# Patient Record
(demographics unavailable — no encounter records)

---

## 2024-10-24 NOTE — HISTORY OF PRESENT ILLNESS
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

## 2024-10-24 NOTE — PHYSICAL EXAM
[Alert] : alert [Oriented x 3] : ~L oriented x 3 [Declined] : declined [FreeTextEntry3] : Focused exam: -pink gritty papule nasal tip brown stuck on papules x 2 upper forehead

## 2024-10-24 NOTE — ASSESSMENT
[FreeTextEntry1] : #Actinic keratoses- nasal tip -new problem, uncertain prognosis -Cryotherapy was performed per procedure note. -asked pt to return for follow up if the spots do not resolve with cryotherapy The risks/benefits/alternatives of cryo-destruction was explained to the patient which, include but are not limited to redness, swelling, pain, blistering, scar, discoloration of skin, and recurrence. The patient expressed understanding of these risks and agreed to the procedure. 1 lesions treated with 2 cycles of LN2. The procedure was well tolerated, without complication. Wound care was reviewed.   #SK - upper forehead chronic, stable -reassurance regarding benign nature cryo today given bothersome nature The risks/benefits/alternatives of cryo-destruction was explained to the patient which, include but are not limited to redness, swelling, pain, blistering, scar, discoloration of skin, and recurrence. The patient expressed understanding of these risks and agreed to the procedure. 2 lesions treated with 2 cycles of LN2. The procedure was well tolerated, without complication. Wound care was reviewed.   RTC PRN

## 2024-11-04 NOTE — PLAN
[FreeTextEntry1] : HCM -F/u bloodwork, call patient with results -Up to date on mammogram, pap smear, f/u gyn as scheduled -Patient states she is up to date with colonoscopy, f/u GI as scheduled -Declines hepatitis/HIV screening -Declines shingles vaccine today but states she will consider in future

## 2024-11-04 NOTE — HEALTH RISK ASSESSMENT
[Yes] : Yes [2 - 4 times a month (2 pts)] : 2-4 times a month (2 points) [1 or 2 (0 pts)] : 1 or 2 (0 points) [Never (0 pts)] : Never (0 points) [No] : In the past 12 months have you used drugs other than those required for medical reasons? No [No falls in past year] : Patient reported no falls in the past year [0] : 2) Feeling down, depressed, or hopeless: Not at all (0) [PHQ-2 Negative - No further assessment needed] : PHQ-2 Negative - No further assessment needed [None] : None [With Family] : lives with family [Employed] : employed [Fully functional (bathing, dressing, toileting, transferring, walking, feeding)] : Fully functional (bathing, dressing, toileting, transferring, walking, feeding) [Fully functional (using the telephone, shopping, preparing meals, housekeeping, doing laundry, using] : Fully functional and needs no help or supervision to perform IADLs (using the telephone, shopping, preparing meals, housekeeping, doing laundry, using transportation, managing medications and managing finances) [Never] : Never [Audit-CScore] : 2 [de-identified] : cardio, walking [de-identified] : balanced [[id number]] : 0 [Patient reported mammogram was normal] : Patient reported mammogram was normal [Patient reported PAP Smear was normal] : Patient reported PAP Smear was normal [Patient reported colonoscopy was normal] : Patient reported colonoscopy was normal [HIV test declined] : HIV test declined [Hepatitis C test declined] : Hepatitis C test declined [Change in mental status noted] : No change in mental status noted [] :  [MammogramDate] : 09/24 [PapSmearDate] : 10/23 [ColonoscopyDate] : 01/22 [FreeTextEntry2] : [employer]

## 2024-11-04 NOTE — HISTORY OF PRESENT ILLNESS
[FreeTextEntry1] : Establish care, CPE [de-identified] : 58 yo female presents to establish care, CPE. Last CPE was about 1 year ago with [facility]. Patient has hx HLD, was recommended for patient to start statin but then she had CTA score about 1 year ago which was 0, did not start medication. Patient has been regularly exercising, reports healthy diet. Patient has hx migraines, currently uses Botox for treatment with Dr. Ramos, symptoms well controlled. Patient had FBSE with derm Dr. Oshea last month, had cryotherapy for AK on her nose.  Patient follows with gyn regularly - has appt with Dr. Darden later this month. Last pap smear was last year, WNL. Patient states she had mammo Sep 2024, WNL. Patient had colonoscopy 2-3 years ago, reports WNL, repeat 10 years.  Overall feeling well today.

## 2024-11-11 NOTE — HISTORY OF PRESENT ILLNESS
[FreeTextEntry1] : Ms. Barnett ( at [facility]) presents for initial evaluation and management of dyslipidemia, chronic venous insufficiency, and migraine headache. She was referred by her PMD to manage dyslipidemia.  In general, she feels well.  She denies chest pain and has COATES to several flights of stairs.  Her lab work from 11/06/24 revealed an LDL of 189.  Of note, she had a CT coronary artery calcium scan on 10/26/23 which revealed a CAC score of zero.  We discussed the limitations of a CT CAC scan and discussed pursuing a CT coronary artery angiogram in the future.

## 2024-11-11 NOTE — HISTORY OF PRESENT ILLNESS
[FreeTextEntry1] : Ms. Barnett ( at [facility]) presents for initial evaluation and management of dyslipidemia, chronic venous insufficiency, and migraine headache. She was referred by her PMD to manage dyslipidemia.  In general, she feels well.  She denies chest pain and has COATES to several flights of stairs.  Her lab work from 11/06/24 revealed an LDL of 189.  Of note, she had a CT coronary artery calcium scan on 10/26/23 which revealed a CAC score of zero.  We discussed the limitations of a CT CAC scan and discussed pursuing a CT coronary artery angiogram in the future.

## 2024-11-11 NOTE — ASSESSMENT
[FreeTextEntry1] : ======================================================================================= 1. Dyslipidemia:  (11/04/24):  - will initiate rosuvastatin 20mg po qd (possible adverse effects of new medications discussed)  - discussed therapeutic lifestyle changes to promote improved lipid metabolism  - check repeat lipid profile next visit   2. Chronic venous insufficiency:   - follow up with vascular surgeon, Angelic Gonzalez MD  3. Elevated BP reading: ? component of "white coat" HTN:  - will order a 24-hour ABPM (BioBeat) to r/o white coat HTN

## 2024-11-13 NOTE — REVIEW OF SYSTEMS
[Joint Pain] : joint pain [Muscle Pain] : muscle pain [Headache] : headache [Fever] : no fever [Chills] : no chills [Difficulty Walking] : no difficulty walking

## 2024-11-13 NOTE — PROCEDURE
[de-identified] : DANNY SUMNER , 57 year old,  female returns for follow up and treatment  very happy with RX  Headaches not controllable by medication alone + response to neurotoxin therapy with significant decrease in duration and frequency of headaches and overall better well being, improved functioning and quality of life.   No interim medical issues that would contraindicate treatment today, since last exam Medically stable -reports no change    Denies pregnancy /lactation or N/A Aware of risks and benefits  consent on file wishes to proceed with injections as per protocol  possible side effects including distal spread of toxin reiterated briefly    NEUROTOXIN PROCEDURE NOTE- MIGRAINE BOTOX TOXIN NEUROLYSIS recommended as a means of intervening in the above problems   verbal pre consent obtained -  OnabotulinumtoxinA (200 units ) reconstituted with preservative free saline   Dilution 1:4 area cleaned with alcohol  ethyl chloride spray and ice applied for pain   SITES  (R) and (L)                           10 units (R) and (L) Procerus                              5 (R) and (L) Frontalis                              20  (R) and (L) Temporalis                          40 (R) and (L) Occipitalis                           30 (R) and (L) Cervical Paraspinals           20 (R) and (L) Trapezius                            30   SUBTOTAL                                          155 Wasted                                                 45  Total                                                     200 units  tolerated the procedure well  cryotherapy applied before and after  no complications reported  return 3 months  call with any concerns

## 2024-11-13 NOTE — HISTORY OF PRESENT ILLNESS
[FreeTextEntry1] : female 57 year - old seen for chronic migraine syndrome    TYPE: Intractable Grade 4  (346.71)  Age of Onset: age 45  Bilateral : Getting Worse Last: 5 years   Duration of Headache: 2 days  Frequency: Patient endorses 15+ headaches a month up to 20  Pain is:  Knife like, debilitating Positive Family History no  MRI/CT: Brain in the past negative  Associated Features: Nausea, Vomiting, Photophobia, Phonophobia, Unilateral, Pulsating  Disability due to Migraine - Lost days from work/school, or frequent tardiness.yes can lose one day a month has to stay home  ER or MD visits: sees PCP a lot  now much less with botox -very happy  Other - Impact on family - relationships or ADL cannot do her housework   Experiencing increasing disability and lessening quality of life.  Many medications have been tried and have not been effective or tolerated includin. Abortives - NSAID's / Triptans / Ergot alkaloid derivatives needs renewal  2. Prophylactics - Antidepressants / Beta Blockers / Ca Channel Blockers / Antiepileptics / Tylenol 3. Increasing dosing of maintenance or breakthrough medication takes high doses NSAIDS  4. Vitamin B6, Magnesium, other over the counter don't help  5. Use of narcotic medications when nothing else is working 6. Stress reduction exercise, acupuncture, avoidance of triggers   Treatment regiments to date have not been effective in controlling symptoms and have caused intolerance with time or other.    Based on the clinical findings and presentation my conclusion is that this patient is an excellent candidate for OnabotulinumtoxinA (Botox).   Discussed- frequent risks and benefits.  Frequent adverse reactions are dysphagia 19% (usually mild), URTI (12%), neck pain 11%, ptosis 20%, and malaise. Side effects only last days to weeks, and are self limiting.  Rare complications include: anaphylaxis and bleeding. Explained possible distant spread of toxin effect.  Botox injections for chronic intractable migraines (G43.019) are FDA approved and on label.  200 units are required with 155 units injected as per algorithm divided into 31 sites. Written informed consent will be obtained at the time of injection. Pregnancy and lactation preclude injection.   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

## 2024-11-13 NOTE — ASSESSMENT
[FreeTextEntry1] : tolerated injections without issue  no AE reported  given ubrelvy samples  also prescribed but needs PA  The patient had the opportunity to ask questions and all were answered to their satisfaction. We will coordinate treatment with the other members of the treatment team. The patient verbalized understanding of the management/plan rationale and agreed with my recommendations.

## 2024-11-14 NOTE — REASON FOR VISIT
[FreeTextEntry1] : ======================================================================= Diagnostic Tests: -------------------------------------------------------------- EC24:  sinus rhythm, inferolateral ST segment depressions.  -------------------------------------------------------------- CT: 10/26/23: CAC scan: CAC score 0.

## 2024-11-14 NOTE — ASSESSMENT
[FreeTextEntry1] : ======================================================================================= 1. Dyslipidemia:  (11/04/24):  - will initiate rosuvastatin 20mg po qd (possible adverse effects of new medications discussed)  - discussed therapeutic lifestyle changes to promote improved lipid metabolism  - check repeat lipid profile next visit   2. Chronic venous insufficiency:   - follow up with vascular surgeon, Angelic Gonzalez MD  3. Elevated BP reading: ? component of "white coat" HTN: s/p 24-hour ABPM Bio-Beat: 24-hour average 133/85, daytime average 142/87, nighttime average 118/81:   - continue TLC to reduce BP

## 2025-02-20 NOTE — REASON FOR VISIT
[Other: ____] : [unfilled] [FreeTextEntry1] : ======================================================================= Diagnostic Tests: -------------------------------------------------------------- EC24:  sinus rhythm, inferolateral ST segment depressions.  -------------------------------------------------------------- CT: 10/26/23: CAC scan: CAC score 0.  -------------------------------------------------------------- Echo: 25: EF 59%, normal study.

## 2025-02-20 NOTE — HISTORY OF PRESENT ILLNESS
[FreeTextEntry1] : Ms. Barnett ( at [facility]) presents for follow up and management of dyslipidemia, chronic venous insufficiency, and migraine headache. She was referred by her PMD to manage dyslipidemia.  In general, she feels well.  She denies chest pain and has COATES to several flights of stairs.  Her lab work from 11/06/24 revealed an LDL of 189.  Of note, she had a CT coronary artery calcium scan on 10/26/23 which revealed a CAC score of zero.  We discussed the limitations of a CT CAC scan and discussed pursuing a CT coronary artery angiogram in the future.  Today, 02/20/25, she had an echocardiogram which revealed an EF of 59% and was a normal study.

## 2025-02-20 NOTE — HISTORY OF PRESENT ILLNESS
[FreeTextEntry1] : Ms. Barnett ( at [facility]) presents for follow up and management of dyslipidemia, chronic venous insufficiency, and migraine headache. She was referred by her PMD to manage dyslipidemia.  In general, she feels well.  She denies chest pain and has COATES to several flights of stairs.  Her lab work from 11/06/24 revealed an LDL of 189.  Of note, she had a CT coronary artery calcium scan on 10/26/23 which revealed a CAC score of zero.  We discussed the limitations of a CT CAC scan and discussed pursuing a CT coronary artery angiogram in the future.  Today, 02/20/25, she had an echocardiogram which revealed an EF of 59% and was a normal study.

## 2025-02-20 NOTE — ASSESSMENT
[FreeTextEntry1] : ======================================================================================= 1. Dyslipidemia:  (11/04/24):   - continue rosuvastatin 20mg po qd (possible adverse effects of new medications discussed)  - discussed therapeutic lifestyle changes to promote improved lipid metabolism  - check repeat lipid profile today   2. Chronic venous insufficiency:   - follow up with vascular surgeon, Angelic Gonzalez MD  3. Elevated BP reading: ? component of "white coat" HTN: s/p 24-hour ABPM Bio-Beat: 24-hour average 133/85, daytime average 142/87, nighttime average 118/81:   - continue TLC to reduce BP

## 2025-03-25 NOTE — REASON FOR VISIT
Yes
[FreeTextEntry1] : ======================================================================= Diagnostic Tests: -------------------------------------------------------------- EC24:  sinus rhythm, inferolateral ST segment depressions.  -------------------------------------------------------------- CT: 10/26/23: CAC scan: CAC score 0.  
[FreeTextEntry1] : ======================================================================= Diagnostic Tests: -------------------------------------------------------------- EC24:  sinus rhythm, inferolateral ST segment depressions.  -------------------------------------------------------------- CT: 10/26/23: CAC scan: CAC score 0.

## 2025-05-13 NOTE — PROCEDURE
[de-identified] : DANNY SUMNER , 57 year old,  female returns for follow up and treatment  very happy with RX  Headaches not controllable by medication alone + response to neurotoxin therapy with significant decrease in duration and frequency of headaches and overall better well being, improved functioning and quality of life.   No interim medical issues that would contraindicate treatment today, since last exam Medically stable -reports no change    Denies pregnancy /lactation or N/A Aware of risks and benefits  consent on file wishes to proceed with injections as per protocol  possible side effects including distal spread of toxin reiterated briefly    NEUROTOXIN PROCEDURE NOTE- MIGRAINE BOTOX TOXIN NEUROLYSIS recommended as a means of intervening in the above problems   verbal pre consent obtained -  OnabotulinumtoxinA (200 units ) reconstituted with preservative free saline   Dilution 1:4 area cleaned with alcohol  ethyl chloride spray and ice applied for pain   SITES  (R) and (L)                           10 units (R) and (L) Procerus                              5 (R) and (L) Frontalis                              20  (R) and (L) Temporalis                          40 (R) and (L) Occipitalis                           30 (R) and (L) Cervical Paraspinals           20 (R) and (L) Trapezius                            30   SUBTOTAL                                          155 Wasted                                                 45  Total                                                     200 units  tolerated the procedure well  cryotherapy applied before and after  no complications reported  return 3 months  call with any concerns